# Patient Record
Sex: MALE | Race: WHITE | NOT HISPANIC OR LATINO | Employment: OTHER | URBAN - METROPOLITAN AREA
[De-identification: names, ages, dates, MRNs, and addresses within clinical notes are randomized per-mention and may not be internally consistent; named-entity substitution may affect disease eponyms.]

---

## 2024-05-17 ENCOUNTER — TELEPHONE (OUTPATIENT)
Dept: UROLOGY | Facility: MEDICAL CENTER | Age: 89
End: 2024-05-17

## 2024-05-17 NOTE — TELEPHONE ENCOUNTER
Called pt to reschedule his 6-19 appt with dr Arroyo at the [facility] due to change in  dr schedule. Appt rescheduled to 7-10-24 at 2:30 with dr Arroyo at [facility].  No answer and no voicemail set up. Will continue to try and reach pt.  **if pt calls back please make him aware of rescheduled appt.